# Patient Record
Sex: FEMALE | Race: WHITE | NOT HISPANIC OR LATINO | Employment: UNEMPLOYED | URBAN - METROPOLITAN AREA
[De-identification: names, ages, dates, MRNs, and addresses within clinical notes are randomized per-mention and may not be internally consistent; named-entity substitution may affect disease eponyms.]

---

## 2017-01-31 ENCOUNTER — CLINICAL SUPPORT (OUTPATIENT)
Dept: OBSTETRICS AND GYNECOLOGY | Facility: CLINIC | Age: 32
End: 2017-01-31

## 2017-01-31 VITALS
DIASTOLIC BLOOD PRESSURE: 89 MMHG | HEIGHT: 65 IN | BODY MASS INDEX: 34.82 KG/M2 | HEART RATE: 95 BPM | WEIGHT: 209 LBS | SYSTOLIC BLOOD PRESSURE: 124 MMHG

## 2017-01-31 DIAGNOSIS — Z30.42 DEPO-PROVERA CONTRACEPTIVE STATUS: Primary | ICD-10-CM

## 2017-01-31 PROCEDURE — 96372 THER/PROPH/DIAG INJ SC/IM: CPT | Performed by: OBSTETRICS & GYNECOLOGY

## 2017-01-31 RX ORDER — MEDROXYPROGESTERONE ACETATE 150 MG/ML
150 INJECTION, SUSPENSION INTRAMUSCULAR ONCE
Status: COMPLETED | OUTPATIENT
Start: 2017-01-31 | End: 2017-01-31

## 2017-01-31 RX ADMIN — MEDROXYPROGESTERONE ACETATE 150 MG: 150 INJECTION, SUSPENSION INTRAMUSCULAR at 13:59

## 2017-01-31 NOTE — PROGRESS NOTES
Subjective   Kay Dawn is a 31 y.o. female. Here for Depo provera inj. Lot # [id number] exp 05/2021 ndc # [address]-9. Pt did not have a reaction.    History of Present Illness    Review of Systems    Objective   Physical Exam    Assessment/Plan   There are no diagnoses linked to this encounter.

## 2017-01-31 NOTE — MR AVS SNAPSHOT
Kay Dawn   2017 2:00 PM   Clinical Support    Dept Phone:  [phone]   Encounter #:  [id number]    Provider:  NURSE [facility]   Department:  [facility]                Your Full Care Plan              Your Updated Medication List          This list is accurate as of: 17  2:01 PM.  Always use your most recent med list.                atorvastatin 10 MG tablet   Commonly known as:  LIPITOR       lisinopril 5 MG tablet   Commonly known as:  PRINIVIL,ZESTRIL       medroxyPROGESTERone 150 MG/ML injection   Commonly known as:  DEPO-PROVERA       metFORMIN  MG 24 hr tablet   Commonly known as:  GLUCOPHAGE-XR               You Were Diagnosed With        Codes Comments    Depo-Provera contraceptive status    -  Primary ICD-10-CM: Z30.40  ICD-9-CM: V25.49       Medications to be Given to You by a Medical Professional     Due       Frequency    (none) MedroxyPROGESTERone Acetate (DEPO-PROVERA) injection 150 mg  Once      Instructions     None    Patient Instructions History      Upcoming Appointments     Visit Type Date Time Department    INJECTION 2017  2:00 PM [facility]      [facility] [place] Signup     [facility] allows you to send messages to your doctor, view your test results, renew your prescriptions, schedule appointments, and more. To sign up, go to [employer] and click on the Sign Up Now link in the New User? box. Enter your [facility] Activation Code exactly as it appears below along with the last four digits of your Social Security Number and your Date of Birth () to complete the sign-up process. If you do not sign up before the expiration date, you must request a new code.    [facility] Activation Code: [email or web address]  Expires: 2017  2:01 PM    If you have questions, you can email [email or web address] or call [phone] to talk to our [facility] staff. Remember, [facility] is NOT to be used for  "urgent needs. For medical emergencies, dial 911.               Other Info from Your Visit           Allergies     Cefaclor  Hives, Shortness Of Breath    Ascorbate      Other      STRAWBERRY'S       Vital Signs     Blood Pressure Pulse Height Weight Body Mass Index Smoking Status    124/89 95 65\" (165.1 cm) 209 lb (94.8 kg) 34.78 kg/m2 Never Smoker      Problems and Diagnoses Noted     On depot medroxyprogesterone acetate for contraception    -  Primary      Medications Administered     MedroxyPROGESTERone Acetate (DEPO-PROVERA) injection 150 mg                      "

## 2017-04-25 ENCOUNTER — CLINICAL SUPPORT (OUTPATIENT)
Dept: OBSTETRICS AND GYNECOLOGY | Facility: CLINIC | Age: 32
End: 2017-04-25

## 2017-04-25 VITALS
WEIGHT: 209 LBS | SYSTOLIC BLOOD PRESSURE: 124 MMHG | DIASTOLIC BLOOD PRESSURE: 78 MMHG | HEIGHT: 65 IN | BODY MASS INDEX: 34.82 KG/M2

## 2017-04-25 DIAGNOSIS — Z30.42 DEPO-PROVERA CONTRACEPTIVE STATUS: Primary | ICD-10-CM

## 2017-04-25 PROCEDURE — 96372 THER/PROPH/DIAG INJ SC/IM: CPT | Performed by: OBSTETRICS & GYNECOLOGY

## 2017-04-25 RX ORDER — LISINOPRIL 5 MG/1
5 TABLET ORAL
COMMUNITY
Start: 2016-04-05

## 2017-04-25 RX ORDER — ATORVASTATIN CALCIUM 20 MG/1
20 TABLET, FILM COATED ORAL
COMMUNITY
Start: 2016-11-21 | End: 2017-11-21

## 2017-04-25 RX ORDER — GLIPIZIDE 10 MG/1
10 TABLET, FILM COATED, EXTENDED RELEASE ORAL
COMMUNITY
Start: 2016-11-21 | End: 2022-06-06

## 2017-04-25 RX ORDER — MEDROXYPROGESTERONE ACETATE 150 MG/ML
150 INJECTION, SUSPENSION INTRAMUSCULAR ONCE
Status: COMPLETED | OUTPATIENT
Start: 2017-04-25 | End: 2017-04-25

## 2017-04-25 RX ADMIN — MEDROXYPROGESTERONE ACETATE 150 MG: 150 INJECTION, SUSPENSION INTRAMUSCULAR at 13:21

## 2017-07-18 ENCOUNTER — CLINICAL SUPPORT (OUTPATIENT)
Dept: OBSTETRICS AND GYNECOLOGY | Facility: CLINIC | Age: 32
End: 2017-07-18

## 2017-07-18 VITALS — BODY MASS INDEX: 34.81 KG/M2 | WEIGHT: 209.2 LBS

## 2017-07-18 DIAGNOSIS — Z30.42 ENCOUNTER FOR DEPO-PROVERA CONTRACEPTION: Primary | ICD-10-CM

## 2017-07-18 PROCEDURE — 96372 THER/PROPH/DIAG INJ SC/IM: CPT | Performed by: OBSTETRICS & GYNECOLOGY

## 2017-07-18 RX ORDER — MEDROXYPROGESTERONE ACETATE 150 MG/ML
150 INJECTION, SUSPENSION INTRAMUSCULAR ONCE
Status: COMPLETED | OUTPATIENT
Start: 2017-07-18 | End: 2017-07-18

## 2017-07-18 RX ADMIN — MEDROXYPROGESTERONE ACETATE 150 MG: 150 INJECTION, SUSPENSION INTRAMUSCULAR at 14:09

## 2017-07-18 NOTE — PROGRESS NOTES
Walker Dawn is a 31 y.o. female.     Cc: Depo injection    History of Present Illness   Pt received injection in Rt deltoid. Pt had no reaction Rto in 12 weeks.     The following portions of the patient's history were reviewed and updated as appropriate: allergies and current medications.    Review of Systems    Objective   Physical Exam    Assessment/Plan   There are no diagnoses linked to this encounter.

## 2017-11-06 ENCOUNTER — CLINICAL SUPPORT (OUTPATIENT)
Dept: OBSTETRICS AND GYNECOLOGY | Facility: CLINIC | Age: 32
End: 2017-11-06

## 2017-11-06 VITALS
SYSTOLIC BLOOD PRESSURE: 134 MMHG | BODY MASS INDEX: 35.79 KG/M2 | HEIGHT: 65 IN | DIASTOLIC BLOOD PRESSURE: 91 MMHG | HEART RATE: 96 BPM | WEIGHT: 214.8 LBS

## 2017-11-06 DIAGNOSIS — Z30.42 DEPO-PROVERA CONTRACEPTIVE STATUS: Primary | ICD-10-CM

## 2017-11-06 LAB
B-HCG UR QL: NEGATIVE
INTERNAL NEGATIVE CONTROL: NEGATIVE
INTERNAL POSITIVE CONTROL: POSITIVE
Lab: NORMAL

## 2017-11-06 PROCEDURE — 81025 URINE PREGNANCY TEST: CPT | Performed by: OBSTETRICS & GYNECOLOGY

## 2017-11-06 PROCEDURE — 96372 THER/PROPH/DIAG INJ SC/IM: CPT | Performed by: OBSTETRICS & GYNECOLOGY

## 2017-11-06 RX ORDER — MEDROXYPROGESTERONE ACETATE 150 MG/ML
150 INJECTION, SUSPENSION INTRAMUSCULAR
Qty: 1 ML | Refills: 0 | Status: SHIPPED | OUTPATIENT
Start: 2017-11-06 | End: 2018-07-23 | Stop reason: SDUPTHER

## 2017-11-06 RX ORDER — MEDROXYPROGESTERONE ACETATE 150 MG/ML
150 INJECTION, SUSPENSION INTRAMUSCULAR ONCE
Status: COMPLETED | OUTPATIENT
Start: 2017-11-06 | End: 2017-11-06

## 2017-11-06 RX ADMIN — MEDROXYPROGESTERONE ACETATE 150 MG: 150 INJECTION, SUSPENSION INTRAMUSCULAR at 15:27

## 2018-02-01 ENCOUNTER — CLINICAL SUPPORT (OUTPATIENT)
Dept: OBSTETRICS AND GYNECOLOGY | Facility: CLINIC | Age: 33
End: 2018-02-01

## 2018-02-01 VITALS — WEIGHT: 210 LBS | HEIGHT: 65 IN | BODY MASS INDEX: 34.99 KG/M2

## 2018-02-01 DIAGNOSIS — N94.6 DYSMENORRHEA: Primary | ICD-10-CM

## 2018-02-01 PROCEDURE — 96372 THER/PROPH/DIAG INJ SC/IM: CPT | Performed by: OBSTETRICS & GYNECOLOGY

## 2018-02-01 RX ORDER — MEDROXYPROGESTERONE ACETATE 150 MG/ML
150 INJECTION, SUSPENSION INTRAMUSCULAR ONCE
Status: COMPLETED | OUTPATIENT
Start: 2018-02-01 | End: 2018-02-01

## 2018-02-01 RX ORDER — ATORVASTATIN CALCIUM 20 MG/1
TABLET, FILM COATED ORAL
COMMUNITY
Start: 2017-11-30

## 2018-02-01 RX ADMIN — MEDROXYPROGESTERONE ACETATE 150 MG: 150 INJECTION, SUSPENSION INTRAMUSCULAR at 13:36

## 2018-02-01 NOTE — PROGRESS NOTES
Pt received depo lot# [id number] exp: 4/2021 NDC# [address]-1. Pt brought. Rt deltoid. No reaction. CORRINE

## 2018-04-30 RX ORDER — MEDROXYPROGESTERONE ACETATE 150 MG/ML
INJECTION, SUSPENSION INTRAMUSCULAR
Refills: 0 | OUTPATIENT
Start: 2018-04-30

## 2018-05-08 ENCOUNTER — CLINICAL SUPPORT (OUTPATIENT)
Dept: OBSTETRICS AND GYNECOLOGY | Facility: CLINIC | Age: 33
End: 2018-05-08

## 2018-05-08 VITALS
BODY MASS INDEX: 46.98 KG/M2 | WEIGHT: 203 LBS | SYSTOLIC BLOOD PRESSURE: 154 MMHG | DIASTOLIC BLOOD PRESSURE: 100 MMHG | HEART RATE: 99 BPM | HEIGHT: 55 IN

## 2018-05-08 DIAGNOSIS — N92.0 MENORRHAGIA WITH REGULAR CYCLE: Primary | ICD-10-CM

## 2018-05-08 PROCEDURE — 96372 THER/PROPH/DIAG INJ SC/IM: CPT | Performed by: OBSTETRICS & GYNECOLOGY

## 2018-05-08 RX ORDER — MEDROXYPROGESTERONE ACETATE 150 MG/ML
150 INJECTION, SUSPENSION INTRAMUSCULAR ONCE
Status: COMPLETED | OUTPATIENT
Start: 2018-05-08 | End: 2018-05-08

## 2018-05-08 RX ORDER — MEDROXYPROGESTERONE ACETATE 150 MG/ML
INJECTION, SUSPENSION INTRAMUSCULAR
COMMUNITY
Start: 2018-01-29 | End: 2018-07-23

## 2018-05-08 RX ADMIN — MEDROXYPROGESTERONE ACETATE 150 MG: 150 INJECTION, SUSPENSION INTRAMUSCULAR at 11:28

## 2018-05-08 NOTE — PROGRESS NOTES
Pt received Depo Provera in Right arm, pt supplied, no reaction, Lot# [id number] Exp. Date 05-31-21, ndc  [address]-9, rto 12 weeks.

## 2018-07-23 ENCOUNTER — OFFICE VISIT (OUTPATIENT)
Dept: OBSTETRICS AND GYNECOLOGY | Facility: CLINIC | Age: 33
End: 2018-07-23

## 2018-07-23 VITALS
WEIGHT: 202 LBS | HEART RATE: 97 BPM | SYSTOLIC BLOOD PRESSURE: 132 MMHG | BODY MASS INDEX: 216.86 KG/M2 | DIASTOLIC BLOOD PRESSURE: 90 MMHG

## 2018-07-23 DIAGNOSIS — Z30.42 ENCOUNTER FOR SURVEILLANCE OF INJECTABLE CONTRACEPTIVE: ICD-10-CM

## 2018-07-23 DIAGNOSIS — Z01.419 ENCOUNTER FOR GYNECOLOGICAL EXAMINATION: Primary | ICD-10-CM

## 2018-07-23 PROCEDURE — G0101 CA SCREEN;PELVIC/BREAST EXAM: HCPCS | Performed by: OBSTETRICS & GYNECOLOGY

## 2018-07-23 RX ORDER — MEDROXYPROGESTERONE ACETATE 150 MG/ML
150 INJECTION, SUSPENSION INTRAMUSCULAR
Qty: 1 ML | Refills: 3 | Status: SHIPPED | OUTPATIENT
Start: 2018-07-23 | End: 2019-04-18 | Stop reason: SDUPTHER

## 2018-07-23 NOTE — PROGRESS NOTES
Subjective   Kay Dawn is a 32 y.o. female here for yearly refill of BC.   Caretaker is wanting pt to have a breast exam    History of Present Illness   Patient is a 32-year-old female that presents for annual gynecological exam.  Patient has never been sexually active.  Patient's caregiver is her sister who is with her today.  Patient's sister was diagnosed last year at the age of 34 with stage II breast cancer.  She states that she underwent genetic testing and was negative.  The patient has no complaints today.  She remains on Depo-Provera and is amenorrheic as a result.  She would like to continue on Depo-Provera.    The following portions of the patient's history were reviewed and updated as appropriate: allergies, current medications, past family history, past medical history, past social history, past surgical history and problem list.    Review of Systems   Genitourinary: Negative for menstrual problem.   All other systems reviewed and are negative.      Objective   Physical Exam  Physical Exam   Constitutional: She appears well-developed and well-nourished.   Cardiovascular: Normal rate and regular rhythm.    Pulmonary/Chest: Effort normal and breath sounds normal. Right breast exhibits no inverted nipple, no mass, no nipple discharge, no skin change and no tenderness. Left breast exhibits no inverted nipple, no mass, no nipple discharge, no skin change and no tenderness.   Abdominal: Soft. She exhibits no distension. There is no tenderness.   Neurological: She is alert.   Psychiatric: She has a normal mood and affect.   Vitals reviewed.      Assessment/Plan   Diagnoses and all orders for this visit:    Encounter for gynecological examination    Encounter for surveillance of injectable contraceptive  -     medroxyPROGESTERone (DEPO-PROVERA) 150 MG/ML injection; Inject 1 mL into the appropriate muscle as directed by prescriber Every 3 (Three) Months.    Patient was counseled on monthly self breast exams  for breast health and her caregiver states that they do breast exams at home.  She was also counseled on starting screening mammograms at the age of 38.  A refill was sent for her Depo-Provera.  She will follow-up in 1 year for next annual exam or sooner if needed.

## 2018-07-26 ENCOUNTER — CLINICAL SUPPORT (OUTPATIENT)
Dept: OBSTETRICS AND GYNECOLOGY | Facility: CLINIC | Age: 33
End: 2018-07-26

## 2018-07-26 VITALS
HEART RATE: 94 BPM | WEIGHT: 203 LBS | BODY MASS INDEX: 33.82 KG/M2 | SYSTOLIC BLOOD PRESSURE: 164 MMHG | DIASTOLIC BLOOD PRESSURE: 105 MMHG | HEIGHT: 65 IN

## 2018-07-26 DIAGNOSIS — Z30.42 DEPO-PROVERA CONTRACEPTIVE STATUS: Primary | ICD-10-CM

## 2018-07-26 PROCEDURE — 96372 THER/PROPH/DIAG INJ SC/IM: CPT | Performed by: OBSTETRICS & GYNECOLOGY

## 2018-07-26 RX ORDER — MEDROXYPROGESTERONE ACETATE 150 MG/ML
150 INJECTION, SUSPENSION INTRAMUSCULAR ONCE
Status: COMPLETED | OUTPATIENT
Start: 2018-07-26 | End: 2018-07-26

## 2018-07-26 RX ADMIN — MEDROXYPROGESTERONE ACETATE 150 MG: 150 INJECTION, SUSPENSION INTRAMUSCULAR at 09:02

## 2018-07-26 NOTE — PROGRESS NOTES
Subjective   Kay Dawn is a 32 y.o. female. Here for depo provera inj. Lot # [id number] exp 7/2020 ndc [address]-2. Pt did not have a reaction.    History of Present Illness        Review of Systems    Objective   Physical Exam    Assessment/Plan   There are no diagnoses linked to this encounter.

## 2018-10-18 ENCOUNTER — CLINICAL SUPPORT (OUTPATIENT)
Dept: OBSTETRICS AND GYNECOLOGY | Facility: CLINIC | Age: 33
End: 2018-10-18

## 2018-10-18 VITALS
BODY MASS INDEX: 33.99 KG/M2 | HEIGHT: 65 IN | DIASTOLIC BLOOD PRESSURE: 85 MMHG | HEART RATE: 95 BPM | WEIGHT: 204 LBS | SYSTOLIC BLOOD PRESSURE: 128 MMHG

## 2018-10-18 DIAGNOSIS — Z30.013 ENCOUNTER FOR INITIAL PRESCRIPTION OF INJECTABLE CONTRACEPTIVE: Primary | ICD-10-CM

## 2018-10-18 PROCEDURE — 96372 THER/PROPH/DIAG INJ SC/IM: CPT | Performed by: OBSTETRICS & GYNECOLOGY

## 2018-10-18 RX ORDER — MEDROXYPROGESTERONE ACETATE 150 MG/ML
150 INJECTION, SUSPENSION INTRAMUSCULAR ONCE
Status: COMPLETED | OUTPATIENT
Start: 2018-10-18 | End: 2018-10-18

## 2018-10-18 RX ADMIN — MEDROXYPROGESTERONE ACETATE 150 MG: 150 INJECTION, SUSPENSION INTRAMUSCULAR at 09:22

## 2019-01-24 ENCOUNTER — CLINICAL SUPPORT (OUTPATIENT)
Dept: OBSTETRICS AND GYNECOLOGY | Facility: CLINIC | Age: 34
End: 2019-01-24

## 2019-01-24 VITALS
DIASTOLIC BLOOD PRESSURE: 92 MMHG | BODY MASS INDEX: 33.49 KG/M2 | WEIGHT: 201 LBS | SYSTOLIC BLOOD PRESSURE: 141 MMHG | HEART RATE: 100 BPM | HEIGHT: 65 IN

## 2019-01-24 DIAGNOSIS — Z30.013 ENCOUNTER FOR INITIAL PRESCRIPTION OF INJECTABLE CONTRACEPTIVE: Primary | ICD-10-CM

## 2019-01-24 PROCEDURE — 96372 THER/PROPH/DIAG INJ SC/IM: CPT | Performed by: OBSTETRICS & GYNECOLOGY

## 2019-01-24 RX ORDER — MEDROXYPROGESTERONE ACETATE 150 MG/ML
150 INJECTION, SUSPENSION INTRAMUSCULAR ONCE
Status: COMPLETED | OUTPATIENT
Start: 2019-01-24 | End: 2019-01-24

## 2019-01-24 RX ADMIN — MEDROXYPROGESTERONE ACETATE 150 MG: 150 INJECTION, SUSPENSION INTRAMUSCULAR at 09:15

## 2019-04-18 ENCOUNTER — TELEPHONE (OUTPATIENT)
Dept: OBSTETRICS AND GYNECOLOGY | Facility: CLINIC | Age: 34
End: 2019-04-18

## 2019-04-18 ENCOUNTER — CLINICAL SUPPORT (OUTPATIENT)
Dept: OBSTETRICS AND GYNECOLOGY | Facility: CLINIC | Age: 34
End: 2019-04-18

## 2019-04-18 VITALS
HEIGHT: 65 IN | HEART RATE: 98 BPM | BODY MASS INDEX: 34.16 KG/M2 | WEIGHT: 205 LBS | DIASTOLIC BLOOD PRESSURE: 96 MMHG | SYSTOLIC BLOOD PRESSURE: 132 MMHG

## 2019-04-18 DIAGNOSIS — Z30.42 ENCOUNTER FOR SURVEILLANCE OF INJECTABLE CONTRACEPTIVE: ICD-10-CM

## 2019-04-18 DIAGNOSIS — Z30.013 ENCOUNTER FOR INITIAL PRESCRIPTION OF INJECTABLE CONTRACEPTIVE: Primary | ICD-10-CM

## 2019-04-18 PROCEDURE — 96372 THER/PROPH/DIAG INJ SC/IM: CPT | Performed by: OBSTETRICS & GYNECOLOGY

## 2019-04-18 RX ORDER — MEDROXYPROGESTERONE ACETATE 150 MG/ML
150 INJECTION, SUSPENSION INTRAMUSCULAR
Qty: 1 ML | Refills: 3 | Status: SHIPPED | OUTPATIENT
Start: 2019-04-18 | End: 2020-06-12 | Stop reason: SDUPTHER

## 2019-04-18 RX ORDER — MEDROXYPROGESTERONE ACETATE 150 MG/ML
150 INJECTION, SUSPENSION INTRAMUSCULAR ONCE
Status: COMPLETED | OUTPATIENT
Start: 2019-04-18 | End: 2019-04-18

## 2019-04-18 RX ADMIN — MEDROXYPROGESTERONE ACETATE 150 MG: 150 INJECTION, SUSPENSION INTRAMUSCULAR at 08:50

## 2019-07-08 ENCOUNTER — CLINICAL SUPPORT (OUTPATIENT)
Dept: OBSTETRICS AND GYNECOLOGY | Facility: CLINIC | Age: 34
End: 2019-07-08

## 2019-07-08 VITALS — WEIGHT: 206 LBS | BODY MASS INDEX: 34.28 KG/M2

## 2019-07-08 DIAGNOSIS — Z30.42 ENCOUNTER FOR MANAGEMENT AND INJECTION OF DEPO-PROVERA: Primary | ICD-10-CM

## 2019-07-08 PROCEDURE — 96372 THER/PROPH/DIAG INJ SC/IM: CPT | Performed by: OBSTETRICS & GYNECOLOGY

## 2019-07-08 RX ORDER — MEDROXYPROGESTERONE ACETATE 150 MG/ML
150 INJECTION, SUSPENSION INTRAMUSCULAR ONCE
Status: COMPLETED | OUTPATIENT
Start: 2019-07-08 | End: 2019-07-08

## 2019-07-08 RX ADMIN — MEDROXYPROGESTERONE ACETATE 150 MG: 150 INJECTION, SUSPENSION INTRAMUSCULAR at 15:04

## 2019-07-08 NOTE — PROGRESS NOTES
CC: PT here for Depo injection. RT D    Lot#:[id number]  Exp:07/2021  NDC:[address]-6    Pt tolerated injection

## 2019-10-02 ENCOUNTER — CLINICAL SUPPORT (OUTPATIENT)
Dept: OBSTETRICS AND GYNECOLOGY | Facility: CLINIC | Age: 34
End: 2019-10-02

## 2019-10-02 VITALS
SYSTOLIC BLOOD PRESSURE: 152 MMHG | BODY MASS INDEX: 33.82 KG/M2 | WEIGHT: 203 LBS | HEART RATE: 93 BPM | DIASTOLIC BLOOD PRESSURE: 102 MMHG | HEIGHT: 65 IN

## 2019-10-02 DIAGNOSIS — Z30.42 ENCOUNTER FOR MANAGEMENT AND INJECTION OF DEPO-PROVERA: Primary | ICD-10-CM

## 2019-10-02 PROCEDURE — 96372 THER/PROPH/DIAG INJ SC/IM: CPT | Performed by: OBSTETRICS & GYNECOLOGY

## 2019-10-02 RX ORDER — MEDROXYPROGESTERONE ACETATE 150 MG/ML
150 INJECTION, SUSPENSION INTRAMUSCULAR ONCE
Status: COMPLETED | OUTPATIENT
Start: 2019-10-02 | End: 2019-10-02

## 2019-10-02 RX ADMIN — MEDROXYPROGESTERONE ACETATE 150 MG: 150 INJECTION, SUSPENSION INTRAMUSCULAR at 10:46

## 2019-10-02 NOTE — PROGRESS NOTES
(Patient supplied)  Patient here for depo injection. Patient did not have a reaction to the injection or medication. Injection on the left deltoid.

## 2020-01-06 ENCOUNTER — CLINICAL SUPPORT (OUTPATIENT)
Dept: OBSTETRICS AND GYNECOLOGY | Facility: CLINIC | Age: 35
End: 2020-01-06

## 2020-01-06 DIAGNOSIS — Z30.42 ENCOUNTER FOR MANAGEMENT AND INJECTION OF DEPO-PROVERA: Primary | ICD-10-CM

## 2020-01-06 PROCEDURE — 96372 THER/PROPH/DIAG INJ SC/IM: CPT | Performed by: OBSTETRICS & GYNECOLOGY

## 2020-01-06 RX ORDER — MEDROXYPROGESTERONE ACETATE 150 MG/ML
150 INJECTION, SUSPENSION INTRAMUSCULAR ONCE
Status: COMPLETED | OUTPATIENT
Start: 2020-01-06 | End: 2020-01-06

## 2020-01-06 RX ADMIN — MEDROXYPROGESTERONE ACETATE 150 MG: 150 INJECTION, SUSPENSION INTRAMUSCULAR at 15:40

## 2020-01-06 NOTE — PROGRESS NOTES
CC: Pt here for Depo injection.  RT D    Lot#:[id number]  Exp:10/2021  NDC:[address]-1    Pt tolerated injection

## 2020-03-31 ENCOUNTER — CLINICAL SUPPORT (OUTPATIENT)
Dept: OBSTETRICS AND GYNECOLOGY | Facility: CLINIC | Age: 35
End: 2020-03-31

## 2020-03-31 VITALS
HEIGHT: 65 IN | WEIGHT: 205 LBS | DIASTOLIC BLOOD PRESSURE: 100 MMHG | SYSTOLIC BLOOD PRESSURE: 144 MMHG | BODY MASS INDEX: 34.16 KG/M2

## 2020-03-31 DIAGNOSIS — Z30.42 ENCOUNTER FOR MANAGEMENT AND INJECTION OF DEPO-PROVERA: Primary | ICD-10-CM

## 2020-03-31 PROCEDURE — 96372 THER/PROPH/DIAG INJ SC/IM: CPT | Performed by: OBSTETRICS & GYNECOLOGY

## 2020-03-31 RX ORDER — MEDROXYPROGESTERONE ACETATE 150 MG/ML
150 INJECTION, SUSPENSION INTRAMUSCULAR ONCE
Status: COMPLETED | OUTPATIENT
Start: 2020-03-31 | End: 2020-03-31

## 2020-03-31 RX ADMIN — MEDROXYPROGESTERONE ACETATE 150 MG: 150 INJECTION, SUSPENSION INTRAMUSCULAR at 15:39

## 2020-03-31 NOTE — PROGRESS NOTES
Patient here for Depo Provera Injection of Right arm, patient supplied, no reaction. Lot # [id number], Exp. Date 02/2022, ndc [address]-7. rto 12 weeks.

## 2020-06-12 ENCOUNTER — TELEPHONE (OUTPATIENT)
Dept: OBSTETRICS AND GYNECOLOGY | Facility: CLINIC | Age: 35
End: 2020-06-12

## 2020-06-12 DIAGNOSIS — Z30.42 ENCOUNTER FOR SURVEILLANCE OF INJECTABLE CONTRACEPTIVE: ICD-10-CM

## 2020-06-12 RX ORDER — MEDROXYPROGESTERONE ACETATE 150 MG/ML
150 INJECTION, SUSPENSION INTRAMUSCULAR
Qty: 1 ML | Refills: 3 | Status: SHIPPED | OUTPATIENT
Start: 2020-06-12 | End: 2021-08-25 | Stop reason: SDUPTHER

## 2020-06-12 NOTE — TELEPHONE ENCOUNTER
Pt's sister, Gosia on [employer]IP[employer], called to request refill on depo, with refills if possible.  Scheduled for injection on 6/15/20

## 2020-06-12 NOTE — TELEPHONE ENCOUNTER
Refill sent.  She probably does need to be seen in the office for an annual, because she has not been seen since July 2018.

## 2020-06-15 ENCOUNTER — CLINICAL SUPPORT (OUTPATIENT)
Dept: OBSTETRICS AND GYNECOLOGY | Facility: CLINIC | Age: 35
End: 2020-06-15

## 2020-06-15 VITALS
HEIGHT: 65 IN | DIASTOLIC BLOOD PRESSURE: 107 MMHG | BODY MASS INDEX: 33.32 KG/M2 | WEIGHT: 200 LBS | SYSTOLIC BLOOD PRESSURE: 161 MMHG | HEART RATE: 94 BPM

## 2020-06-15 DIAGNOSIS — Z30.42 ENCOUNTER FOR SURVEILLANCE OF INJECTABLE CONTRACEPTIVE: Primary | ICD-10-CM

## 2020-06-15 PROCEDURE — 96372 THER/PROPH/DIAG INJ SC/IM: CPT | Performed by: OBSTETRICS & GYNECOLOGY

## 2020-06-15 RX ORDER — MEDROXYPROGESTERONE ACETATE 150 MG/ML
150 INJECTION, SUSPENSION INTRAMUSCULAR ONCE
Status: COMPLETED | OUTPATIENT
Start: 2020-06-15 | End: 2020-06-15

## 2020-06-15 RX ADMIN — MEDROXYPROGESTERONE ACETATE 150 MG: 150 INJECTION, SUSPENSION INTRAMUSCULAR at 15:12

## 2020-09-15 ENCOUNTER — CLINICAL SUPPORT (OUTPATIENT)
Dept: OBSTETRICS AND GYNECOLOGY | Facility: CLINIC | Age: 35
End: 2020-09-15

## 2020-09-15 VITALS
HEART RATE: 84 BPM | BODY MASS INDEX: 34.16 KG/M2 | DIASTOLIC BLOOD PRESSURE: 95 MMHG | HEIGHT: 65 IN | WEIGHT: 205 LBS | SYSTOLIC BLOOD PRESSURE: 138 MMHG

## 2020-09-15 DIAGNOSIS — Z30.42 ENCOUNTER FOR MANAGEMENT AND INJECTION OF DEPO-PROVERA: Primary | ICD-10-CM

## 2020-09-15 PROCEDURE — 96372 THER/PROPH/DIAG INJ SC/IM: CPT | Performed by: OBSTETRICS & GYNECOLOGY

## 2020-09-15 RX ORDER — MEDROXYPROGESTERONE ACETATE 150 MG/ML
150 INJECTION, SUSPENSION INTRAMUSCULAR ONCE
Status: COMPLETED | OUTPATIENT
Start: 2020-09-15 | End: 2020-09-15

## 2020-09-15 RX ADMIN — MEDROXYPROGESTERONE ACETATE 150 MG: 150 INJECTION, SUSPENSION INTRAMUSCULAR at 12:11

## 2020-09-15 NOTE — PROGRESS NOTES
(Patient supplied)  Patient here for depo injection. Patient did not have a reaction to the injection or medication. Injection on the right deltoid. [id number] 08/2022

## 2020-12-15 ENCOUNTER — CLINICAL SUPPORT (OUTPATIENT)
Dept: OBSTETRICS AND GYNECOLOGY | Facility: CLINIC | Age: 35
End: 2020-12-15

## 2020-12-15 VITALS
HEIGHT: 65 IN | WEIGHT: 207 LBS | BODY MASS INDEX: 34.49 KG/M2 | SYSTOLIC BLOOD PRESSURE: 167 MMHG | DIASTOLIC BLOOD PRESSURE: 101 MMHG

## 2020-12-15 DIAGNOSIS — Z30.42 ENCOUNTER FOR MANAGEMENT AND INJECTION OF DEPO-PROVERA: Primary | ICD-10-CM

## 2020-12-15 PROCEDURE — 96372 THER/PROPH/DIAG INJ SC/IM: CPT | Performed by: OBSTETRICS & GYNECOLOGY

## 2020-12-15 RX ORDER — MEDROXYPROGESTERONE ACETATE 150 MG/ML
150 INJECTION, SUSPENSION INTRAMUSCULAR ONCE
Status: COMPLETED | OUTPATIENT
Start: 2020-12-15 | End: 2020-12-15

## 2020-12-15 RX ADMIN — MEDROXYPROGESTERONE ACETATE 150 MG: 150 INJECTION, SUSPENSION INTRAMUSCULAR at 14:18

## 2020-12-15 NOTE — PROGRESS NOTES
Patient received Depo of Right arm, patient supplied, no reaction. Lot# [id number], Exp. Date 01/2023, ndc [address]-0. RTO 12 Weeks for Depo & Annual. Patient also advised to See PCP for BP evaluation. Mother accompanied patient and voiced understanding.

## 2021-04-14 ENCOUNTER — TELEPHONE (OUTPATIENT)
Dept: OBSTETRICS AND GYNECOLOGY | Facility: CLINIC | Age: 36
End: 2021-04-14

## 2021-08-25 ENCOUNTER — OFFICE VISIT (OUTPATIENT)
Dept: OBSTETRICS AND GYNECOLOGY | Facility: CLINIC | Age: 36
End: 2021-08-25

## 2021-08-25 VITALS — BODY MASS INDEX: 33.95 KG/M2 | DIASTOLIC BLOOD PRESSURE: 119 MMHG | WEIGHT: 204 LBS | SYSTOLIC BLOOD PRESSURE: 183 MMHG

## 2021-08-25 DIAGNOSIS — Z01.419 ENCOUNTER FOR ANNUAL ROUTINE GYNECOLOGICAL EXAMINATION: Primary | ICD-10-CM

## 2021-08-25 DIAGNOSIS — Z80.3 FAMILY HISTORY OF BREAST CANCER: ICD-10-CM

## 2021-08-25 DIAGNOSIS — Z30.42 ENCOUNTER FOR SURVEILLANCE OF INJECTABLE CONTRACEPTIVE: ICD-10-CM

## 2021-08-25 DIAGNOSIS — I10 ESSENTIAL HYPERTENSION: ICD-10-CM

## 2021-08-25 PROCEDURE — G0101 CA SCREEN;PELVIC/BREAST EXAM: HCPCS | Performed by: NURSE PRACTITIONER

## 2021-08-25 PROCEDURE — 99212 OFFICE O/P EST SF 10 MIN: CPT | Performed by: NURSE PRACTITIONER

## 2021-08-25 RX ORDER — MEDROXYPROGESTERONE ACETATE 150 MG/ML
150 INJECTION, SUSPENSION INTRAMUSCULAR
Qty: 1 ML | Refills: 3 | Status: SHIPPED | OUTPATIENT
Start: 2021-08-25 | End: 2021-09-01 | Stop reason: SDUPTHER

## 2021-08-25 NOTE — PROGRESS NOTES
GYN Annual Exam     Chief Complaint   Patient presents with   • Gynecologic Exam     Annual exam - Last ae . Needs refills on depo provera. Last injection December 15, 2020.        HPI    Kay Dawn is a 35 y.o. female who presents for annual well woman exam.  She has never been sexually active. Periods are absent with DMPA No discharge. Performing SBE:no. She is using DMPA for menstrual cessation. Last dose received 2020. Her mother who is also her care giver is with her today and states lapse in dosing has been due to care givers health issues, she has been unable to get Kay to the office for DMPA as a result.  Kay has recently started to have some light vaginal spotting. She would like to resume DMPA. Kay states she is doing well today. Currently lives with her mother, brother-in-law, and her two nephews. She attends an adult day program during the week.     BP elevated today. Her care giver states this elevation is typical of Kay when she is anxious about medical appointments. She reports medication compliance. She has upcoming appt with PCP to f/u on HTN. Her mother states she will monitor BP at home, she is familiar with normal parameters.     Sister, PGM, and MGM with hx breast cancer. Sister was diagnosed at 34 and is . One year anniversary of her death is coming up. Discussed BRCA testing    This is my first time meeting Kay Dawn  She is a pt of Dr De Souza's last seen in 2018    OB History    No obstetric history on file.         LMP-absent  Current contraception: Depo-Provera injections  Last Pap-never  Family history of uterine, colon or ovarian cancer: no  Family history of breast cancer: yes - Sister, PGM, MGM    Past Medical History:   Diagnosis Date   • CP (cerebral palsy) (CMS/HCC)    • Diabetes mellitus (CMS/HCC)        History reviewed. No pertinent surgical history.      Current Outpatient Medications:   •  atorvastatin (LIPITOR) 20 MG tablet, TAKE ONE TABLET BY  MOUTH ONCE NIGHTLY, Disp: , Rfl:   •  glipiZIDE (GLUCOTROL) 10 MG 24 hr tablet, Take 10 mg by mouth., Disp: , Rfl:   •  lisinopril (PRINIVIL,ZESTRIL) 5 MG tablet, Take 5 mg by mouth., Disp: , Rfl:   •  medroxyPROGESTERone (DEPO-PROVERA) 150 MG/ML injection, Inject 1 mL into the appropriate muscle as directed by prescriber Every 3 (Three) Months., Disp: 1 mL, Rfl: 3  •  metFORMIN XR (GLUCOPHAGE-XR) 500 MG 24 hr tablet, Take 1,000 mg by mouth., Disp: , Rfl:     Allergies   Allergen Reactions   • Cefaclor Hives and Shortness Of Breath   • Other Hives     STRAWBERRY'S   STRAWBERRY'S   STRAWBERRY'S        Social History     Tobacco Use   • Smoking status: Never Smoker   Substance Use Topics   • Alcohol use: No   • Drug use: No       Family History   Problem Relation Age of Onset   • Breast cancer Sister    • Breast cancer Paternal Grandmother    • Breast cancer Maternal Grandmother    • Diabetes Mother    • Heart defect Mother        Review of Systems   Constitutional: Negative for chills, fatigue and fever.   Respiratory: Negative for chest tightness and shortness of breath.    Cardiovascular: Negative for chest pain and palpitations.   Gastrointestinal: Negative for abdominal distention, abdominal pain, nausea and vomiting.   Genitourinary: Positive for amenorrhea. Negative for breast discharge, breast lump, breast pain, dysuria, menstrual problem, pelvic pain, vaginal bleeding, vaginal discharge and vaginal pain.   Musculoskeletal: Negative for gait problem.   Neurological: Negative for dizziness and headache.       BP (!) 183/119   Wt 92.5 kg (204 lb)   BMI 33.95 kg/m²     Physical Exam  Constitutional:       Appearance: Normal appearance. She is obese.   Genitourinary:      Genitourinary Comments: deferred   HENT:      Head: Atraumatic.   Cardiovascular:      Rate and Rhythm: Normal rate.   Pulmonary:      Effort: Pulmonary effort is normal.   Chest:      Comments: Care giver declines  Abdominal:      General:  There is no distension.      Palpations: Abdomen is soft. There is no mass.      Tenderness: There is no abdominal tenderness. There is no guarding.      Hernia: No hernia is present.   Musculoskeletal:         General: Normal range of motion.   Neurological:      Mental Status: She is alert. Mental status is at baseline.   Skin:     General: Skin is warm and dry.   Vitals and nursing note reviewed.          Assessment     1. Annual Gyn Exam  2. Contraception management  3. HTN  4. Family history of breast cancer    Plan   1. Well woman exam: Pap collected No. Recommend MVI daily.    2. Contraception: DMPA refilled  3. STD: Desires STD screen today- No.   4. Smoking status: nonsmoker  5.   Patient's Body mass index is 33.95 kg/m². indicating that she is obese by BMI (BMI >30).    6.    Encouraged annual mammogram screening starting at age 40. Instructed on how to perform SBE. Encouraged breast health self awareness.  7.    Encouraged 150 minutes of exercise per week if not medially contraindicated.   8.   Discussed BRCA testing  9.   Pt mother to monitor BP at home, she is familiar with normal parameters. Keep f/u with PCP    Follow Up one year or PRN    Ingrid Torres, APRN  8/25/2021  14:56 EDT

## 2021-09-01 ENCOUNTER — CLINICAL SUPPORT (OUTPATIENT)
Dept: OBSTETRICS AND GYNECOLOGY | Facility: CLINIC | Age: 36
End: 2021-09-01

## 2021-09-01 DIAGNOSIS — Z30.42 ENCOUNTER FOR SURVEILLANCE OF INJECTABLE CONTRACEPTIVE: Primary | ICD-10-CM

## 2021-09-01 PROCEDURE — 96372 THER/PROPH/DIAG INJ SC/IM: CPT | Performed by: NURSE PRACTITIONER

## 2021-09-01 RX ORDER — MEDROXYPROGESTERONE ACETATE 150 MG/ML
150 INJECTION, SUSPENSION INTRAMUSCULAR ONCE
Status: COMPLETED | OUTPATIENT
Start: 2021-09-01 | End: 2021-09-01

## 2021-09-01 RX ADMIN — MEDROXYPROGESTERONE ACETATE 150 MG: 150 INJECTION, SUSPENSION INTRAMUSCULAR at 13:51

## 2021-09-01 NOTE — PROGRESS NOTES
Pt supplied depo provera 150 mg  IM right deltoid  Tolerated well, no reaction  NDC [address]-84  LOT: [id number]  EXP: 03/2024  Pt is not sexually active and was unable to leave urine for uhcg.

## 2021-11-24 ENCOUNTER — CLINICAL SUPPORT (OUTPATIENT)
Dept: OBSTETRICS AND GYNECOLOGY | Facility: CLINIC | Age: 36
End: 2021-11-24

## 2021-11-24 DIAGNOSIS — Z30.42 ENCOUNTER FOR SURVEILLANCE OF INJECTABLE CONTRACEPTIVE: Primary | ICD-10-CM

## 2021-11-24 PROCEDURE — 96372 THER/PROPH/DIAG INJ SC/IM: CPT | Performed by: NURSE PRACTITIONER

## 2021-11-24 RX ORDER — MEDROXYPROGESTERONE ACETATE 150 MG/ML
150 INJECTION, SUSPENSION INTRAMUSCULAR ONCE
Status: COMPLETED | OUTPATIENT
Start: 2021-11-24 | End: 2021-11-24

## 2021-11-24 RX ADMIN — MEDROXYPROGESTERONE ACETATE 150 MG: 150 INJECTION, SUSPENSION INTRAMUSCULAR at 12:58

## 2022-02-23 ENCOUNTER — CLINICAL SUPPORT (OUTPATIENT)
Dept: OBSTETRICS AND GYNECOLOGY | Facility: CLINIC | Age: 37
End: 2022-02-23

## 2022-02-23 DIAGNOSIS — Z30.42 ENCOUNTER FOR SURVEILLANCE OF INJECTABLE CONTRACEPTIVE: Primary | ICD-10-CM

## 2022-02-23 PROCEDURE — 96372 THER/PROPH/DIAG INJ SC/IM: CPT | Performed by: NURSE PRACTITIONER

## 2022-02-23 RX ORDER — MEDROXYPROGESTERONE ACETATE 150 MG/ML
150 INJECTION, SUSPENSION INTRAMUSCULAR ONCE
Status: COMPLETED | OUTPATIENT
Start: 2022-02-23 | End: 2022-02-23

## 2022-02-23 RX ORDER — MEDROXYPROGESTERONE ACETATE 150 MG/ML
INJECTION, SUSPENSION INTRAMUSCULAR
COMMUNITY
Start: 2022-02-15 | End: 2022-02-23 | Stop reason: SDUPTHER

## 2022-02-23 RX ADMIN — MEDROXYPROGESTERONE ACETATE 150 MG: 150 INJECTION, SUSPENSION INTRAMUSCULAR at 13:10

## 2022-06-06 ENCOUNTER — CLINICAL SUPPORT (OUTPATIENT)
Dept: OBSTETRICS AND GYNECOLOGY | Facility: CLINIC | Age: 37
End: 2022-06-06

## 2022-06-06 VITALS
SYSTOLIC BLOOD PRESSURE: 176 MMHG | BODY MASS INDEX: 33.99 KG/M2 | WEIGHT: 204 LBS | HEART RATE: 94 BPM | DIASTOLIC BLOOD PRESSURE: 108 MMHG | HEIGHT: 65 IN

## 2022-06-06 DIAGNOSIS — Z30.42 ENCOUNTER FOR SURVEILLANCE OF INJECTABLE CONTRACEPTIVE: Primary | ICD-10-CM

## 2022-06-06 PROCEDURE — 96372 THER/PROPH/DIAG INJ SC/IM: CPT | Performed by: OBSTETRICS & GYNECOLOGY

## 2022-06-06 RX ORDER — GLIPIZIDE 10 MG/1
1 TABLET, FILM COATED, EXTENDED RELEASE ORAL DAILY
COMMUNITY
Start: 2022-04-04

## 2022-06-06 RX ORDER — MEDROXYPROGESTERONE ACETATE 150 MG/ML
INJECTION, SUSPENSION INTRAMUSCULAR
COMMUNITY
Start: 2022-06-04

## 2022-06-06 RX ORDER — MEDROXYPROGESTERONE ACETATE 150 MG/ML
150 INJECTION, SUSPENSION INTRAMUSCULAR ONCE
Status: COMPLETED | OUTPATIENT
Start: 2022-06-06 | End: 2022-06-06

## 2022-06-06 RX ADMIN — MEDROXYPROGESTERONE ACETATE 150 MG: 150 INJECTION, SUSPENSION INTRAMUSCULAR at 15:13
